# Patient Record
Sex: FEMALE
[De-identification: names, ages, dates, MRNs, and addresses within clinical notes are randomized per-mention and may not be internally consistent; named-entity substitution may affect disease eponyms.]

---

## 2017-05-18 ENCOUNTER — APPOINTMENT (OUTPATIENT)
Dept: OPHTHALMOLOGY | Facility: CLINIC | Age: 47
End: 2017-05-18

## 2018-07-23 ENCOUNTER — APPOINTMENT (OUTPATIENT)
Dept: OPHTHALMOLOGY | Facility: CLINIC | Age: 48
End: 2018-07-23
Payer: COMMERCIAL

## 2018-07-23 PROCEDURE — 92020 GONIOSCOPY: CPT

## 2018-07-23 PROCEDURE — 76513 OPH US DX ANT SGM US UNI/BI: CPT | Mod: RT

## 2018-07-23 PROCEDURE — 92012 INTRM OPH EXAM EST PATIENT: CPT

## 2018-12-10 ENCOUNTER — APPOINTMENT (OUTPATIENT)
Dept: OPHTHALMOLOGY | Facility: CLINIC | Age: 48
End: 2018-12-10
Payer: COMMERCIAL

## 2018-12-10 PROCEDURE — 66761 REVISION OF IRIS: CPT | Mod: LT

## 2019-01-14 ENCOUNTER — APPOINTMENT (OUTPATIENT)
Dept: OPHTHALMOLOGY | Facility: CLINIC | Age: 49
End: 2019-01-14
Payer: COMMERCIAL

## 2019-01-14 DIAGNOSIS — H40.033 ANATOMICAL NARROW ANGLE, BILATERAL: ICD-10-CM

## 2019-01-14 PROCEDURE — 66761 REVISION OF IRIS: CPT | Mod: RT

## 2019-05-15 ENCOUNTER — NON-APPOINTMENT (OUTPATIENT)
Age: 49
End: 2019-05-15

## 2019-05-15 ENCOUNTER — APPOINTMENT (OUTPATIENT)
Dept: OPHTHALMOLOGY | Facility: CLINIC | Age: 49
End: 2019-05-15
Payer: COMMERCIAL

## 2019-05-15 PROCEDURE — 92020 GONIOSCOPY: CPT

## 2019-05-15 PROCEDURE — 92014 COMPRE OPH EXAM EST PT 1/>: CPT

## 2022-03-11 ENCOUNTER — APPOINTMENT (OUTPATIENT)
Dept: OTOLARYNGOLOGY | Facility: CLINIC | Age: 52
End: 2022-03-11
Payer: COMMERCIAL

## 2022-03-11 VITALS — TEMPERATURE: 97.6 F | BODY MASS INDEX: 26.22 KG/M2 | WEIGHT: 148 LBS | HEIGHT: 63 IN

## 2022-03-11 DIAGNOSIS — J34.2 DEVIATED NASAL SEPTUM: ICD-10-CM

## 2022-03-11 DIAGNOSIS — F17.210 NICOTINE DEPENDENCE, CIGARETTES, UNCOMPLICATED: ICD-10-CM

## 2022-03-11 DIAGNOSIS — J31.0 CHRONIC RHINITIS: ICD-10-CM

## 2022-03-11 DIAGNOSIS — K21.9 GASTRO-ESOPHAGEAL REFLUX DISEASE W/OUT ESOPHAGITIS: ICD-10-CM

## 2022-03-11 PROCEDURE — 99214 OFFICE O/P EST MOD 30 MIN: CPT | Mod: 25

## 2022-03-11 PROCEDURE — 31231 NASAL ENDOSCOPY DX: CPT

## 2022-03-11 RX ORDER — PREDNISOLONE ACETATE 10 MG/ML
1 SUSPENSION/ DROPS OPHTHALMIC
Qty: 1 | Refills: 1 | Status: DISCONTINUED | COMMUNITY
Start: 2018-12-10 | End: 2022-03-11

## 2022-03-11 NOTE — CONSULT LETTER
[FreeTextEntry2] : JORGE ALBERTO PARKER\par  [FreeTextEntry1] : \par \par Dear  Dr. JORGE ALBERTO PARKER,\par \par I had the pleasure of seeing your patient today.  \par Please see my note below.\par \par \par Thank you very much for allowing me to participate in the care of your patient.\par \par Sincerely,\par \par \par Juancarlos\par \par Yonatan Rodrigues MD\par [facility]\par [facility]\par  [facility]\par \par

## 2022-03-11 NOTE — REVIEW OF SYSTEMS
[Post Nasal Drip] : post nasal drip [Throat Clearing] : throat clearing [Throat Pain] : throat pain [Negative] : Heme/Lymph [de-identified] : headache [de-identified] : difficulty swallowing [FreeTextEntry4] : neck lump/mass

## 2022-03-11 NOTE — ASSESSMENT
[FreeTextEntry1] : It was my impression is that the patient's symptoms were from laryngeal evidence of reflux.  I reviewed an anti-reflux diet at length with the patient.  I recommended a course of famotidine 40 mg at bedtime.  I suggested a repeat evaluation in 4-6 weeks to make sure that the patient is improving.  If not I would recommend further evaluation including possibly pH testing, PPI therapy and/or further GI evaluation.\par \par She has a rhinitis and I recommended just using a nasal saline spray at this point but would consider adding a nasal steroid if she is not doing well enough.\par \par The area of her slight tenderness and fullness is the left submandibular gland which I did not find any pathology\par \par I explained that smoking or using Juul is certainly an additional irritant and I could not say how much of her symptoms remain from that and counseled against continuing smoking

## 2022-03-11 NOTE — PHYSICAL EXAM
[FreeTextEntry1] : \par The patient was alert and oriented and in no distress.\par Voice was clear.\par \par Face:\par The patient had no facial asymmetry or mass.\par The skin was unremarkable.\par \par Eyes:\par The pupils were equal round and reactive to light and accommodation.\par There was no significant nystagmus or disconjugate gaze noted.\par \par Nose: \par The external nose had no significant deformity.  There was no facial tenderness.  On anterior rhinoscopy, the nasal mucosa was clear.  The anterior septum was midline.  There were no visualized polyps purulence  or masses.\par \par Oral cavity:\par The oral mucosa was normal.\par The oral and base of tongue were clear and without mass.\par The gingival and buccal mucosa were moist and without lesions.\par The palate moved well.\par There was no cleft to the palate.\par There appeared to be good salivary flow.  \par There was no pus, erythema or mass in the oral cavity.\par \par \par Ears:\par The external ears were normal without deformity.\par The ear canals were clear.\par The tympanic membranes were intact and normal.\par \par Neck: \par The neck was symmetrical.\par The parotid and submandibular glands were normal without masses.\par The trachea was midline and there was no unusual crepitus.\par The thyroid was smooth and nontender and no masses were palpated.\par There was no significant cervical adenopathy.\par The area of her fullness is the left submandibular gland which is without mass\par \par Neuro:\par Neurologically, the patient was awake, alert, and oriented to person, place and time. There were no obvious focal neurologic abnormalities.  Cranial nerves II through XII were grossly intact.\par \par \par TMJ:\par The temporomandibular joints were nontender.\par There was no abnormal crepitus and no significant malocclusion\par  [de-identified] : Nasal endoscopy: \par CPT 31226\par Procedure Note:\par \par Endoscopy was done with Covid precautions and with video. All risks and benefits were discussed with the patient and consent obtained.\par \par Nasal endoscopy was done with topical anesthesia of Pontocaine and Afrin and a      nasal endoscope.\par Indication: Nasal congestion, rule out sinusitis.\par Procedure: The nasal cavity was anesthetized with topical Afrin and Pontocaine. An  endoscope was used and inserted into the nasal cavity.\par Attention was first paid to the anterior nasal cavity.\par Endocoscopy was performed to inspect the interior of the nasal cavity, the nasal septum,  the middle and superior meati, the inferior, middle and superior turbinates, and the spheno-ethmoidal  recesses, the nasopharynx and eustachian tube orifices bilaterally. \par All findings were normal except:\par The nasal mucosa is boggy and congested and there is thick but clear mucus in the left middle meatus but without polyps purulence or masses.\par \par Flexible fiberoptic laryngoscopy: CPT 03870\par Indications: Dysphagia\par Procedure note:\par \par Flexible fiberoptic laryngoscopy was performed because of dysphagia and because of the patient's inability to tolerate adequate mirror examination.\par \par The nasal cavity was anesthetized with Pontocaine and Afrin.\par The flexible endoscope was placed into the patient's nasal cavity.\par The nasopharynx was without masses.\par The oropharynx, vallecula and base of tongue had no masses.\par The epiglottis, aryepiglottic folds and false vocal cords were normal.\par The pyriform sinuses were without mucosal lesions or pooling of secretions.  \par The laryngeal ventricles were without lesions.\par The visualized subglottis was without mass.\par The lateral and posterior pharyngeal walls were clear and symmetrical.\par The vocal folds were clear and mobile; they abducted and adducted normally.\par There was no interarytenoid mass or fullness.\par \par Endoscopy was done with Covid precautions and with video. All risks and benefits were discussed with the patient and consent obtained.\par \par She has moderate posterior laryngeal inflammation consistent with reflux\par \par Her base of tongue is soft to palpation\par \par The area of fullness is her left submandibular gland which was without mass or palpable stone\par \par

## 2022-03-11 NOTE — HISTORY OF PRESENT ILLNESS
[de-identified] : PARVEZ SYED is a 52 year old female who comes in complaining of chronic throat clearing.  She had tried treatment for reflux but this did not help.  She has been smoking until about 8 months ago and still does a Juul once in a while.  She also is complaining of some tenderness and swelling in the left neck.  She has a postnasal drip and the feeling that there is a tightness in her throat.  It is more hard for her to swallow once in a while.  The patient had no other ear nose or throat complaints at this visit.\par

## 2022-05-16 ENCOUNTER — APPOINTMENT (OUTPATIENT)
Dept: OTOLARYNGOLOGY | Facility: CLINIC | Age: 52
End: 2022-05-16

## 2023-03-23 ENCOUNTER — RX RENEWAL (OUTPATIENT)
Age: 53
End: 2023-03-23

## 2023-03-23 RX ORDER — FAMOTIDINE 40 MG/1
40 TABLET, FILM COATED ORAL
Qty: 30 | Refills: 5 | Status: ACTIVE | COMMUNITY
Start: 2022-03-14 | End: 1900-01-01

## 2024-02-22 ENCOUNTER — TRANSCRIPTION ENCOUNTER (OUTPATIENT)
Age: 54
End: 2024-02-22

## 2024-02-22 ENCOUNTER — RESULT REVIEW (OUTPATIENT)
Age: 54
End: 2024-02-22

## 2024-02-23 ENCOUNTER — NON-APPOINTMENT (OUTPATIENT)
Age: 54
End: 2024-02-23

## 2024-04-26 ENCOUNTER — NON-APPOINTMENT (OUTPATIENT)
Age: 54
End: 2024-04-26

## 2024-07-23 ENCOUNTER — APPOINTMENT (OUTPATIENT)
Dept: OPHTHALMOLOGY | Facility: CLINIC | Age: 54
End: 2024-07-23
Payer: COMMERCIAL

## 2024-07-23 ENCOUNTER — NON-APPOINTMENT (OUTPATIENT)
Age: 54
End: 2024-07-23

## 2024-07-23 PROCEDURE — 76513 OPH US DX ANT SGM US UNI/BI: CPT | Mod: LT

## 2024-07-23 PROCEDURE — 92083 EXTENDED VISUAL FIELD XM: CPT

## 2024-07-23 PROCEDURE — 92002 INTRM OPH EXAM NEW PATIENT: CPT

## 2025-07-23 ENCOUNTER — NON-APPOINTMENT (OUTPATIENT)
Age: 55
End: 2025-07-23

## 2025-07-23 ENCOUNTER — APPOINTMENT (OUTPATIENT)
Dept: OPHTHALMOLOGY | Facility: CLINIC | Age: 55
End: 2025-07-23
Payer: COMMERCIAL

## 2025-07-23 PROCEDURE — 92012 INTRM OPH EXAM EST PATIENT: CPT

## 2025-07-23 PROCEDURE — 92133 CPTRZD OPH DX IMG PST SGM ON: CPT

## 2025-07-23 PROCEDURE — 92083 EXTENDED VISUAL FIELD XM: CPT
